# Patient Record
Sex: MALE | URBAN - METROPOLITAN AREA
[De-identification: names, ages, dates, MRNs, and addresses within clinical notes are randomized per-mention and may not be internally consistent; named-entity substitution may affect disease eponyms.]

---

## 2024-04-05 ENCOUNTER — OV NP (OUTPATIENT)
Dept: URBAN - METROPOLITAN AREA CLINIC 19 | Facility: CLINIC | Age: 29
End: 2024-04-05
Payer: COMMERCIAL

## 2024-04-05 ENCOUNTER — LAB (OUTPATIENT)
Dept: URBAN - METROPOLITAN AREA CLINIC 19 | Facility: CLINIC | Age: 29
End: 2024-04-05

## 2024-04-05 VITALS
OXYGEN SATURATION: 97 % | SYSTOLIC BLOOD PRESSURE: 140 MMHG | HEIGHT: 72 IN | HEART RATE: 77 BPM | TEMPERATURE: 97.3 F | BODY MASS INDEX: 35.21 KG/M2 | WEIGHT: 260 LBS | DIASTOLIC BLOOD PRESSURE: 90 MMHG

## 2024-04-05 DIAGNOSIS — R10.84 ABDOMINAL CRAMPING, GENERALIZED: ICD-10-CM

## 2024-04-05 DIAGNOSIS — K64.9 HEMORRHOIDS: ICD-10-CM

## 2024-04-05 PROCEDURE — 99203 OFFICE O/P NEW LOW 30 MIN: CPT | Performed by: INTERNAL MEDICINE

## 2024-04-05 NOTE — PHYSICAL EXAM GASTROINTESTINAL
Abdomen , soft, periumbilical mild tenderness, nondistended, no guarding or rigidity, no masses palpable, normal bowel sounds Liver and Spleen, no hepatosplenomegaly Rectal deferred

## 2024-04-05 NOTE — HPI-TODAY'S VISIT:
The patient elicits having abdominal pain. Location: Periumbilical Duration of symptoms: 3 weeks Associated symptoms: None Severity/ Pain scale: 3-4/10 What alleviates the symptoms: sleeping What aggravates the symptoms: Movement Any recent weight changes:No Any recent medication changes: No Any recent dietary changes: Eating healthier. More chicken and rice Previous work-up- labs,imaging, scopes: Went to [place] last week.  Labs records reviewed: TBili 1.4 otherwise unremarkable. Normal CBC w/diff. He was discharged without any treatment Having normal BMs (Gregg 4)  Denies nausea, vomiting, fever, chills, hematuria or weight changes. He believes he may have an umbilical hernia  Drinks EtOH occasionally

## 2024-04-08 LAB
IMMUNOGLOBULIN A: 211
INTERPRETATION: (no result)
LIPASE: 28
TISSUE TRANSGLUTAMINASE AB, IGA: <1